# Patient Record
Sex: MALE
[De-identification: names, ages, dates, MRNs, and addresses within clinical notes are randomized per-mention and may not be internally consistent; named-entity substitution may affect disease eponyms.]

---

## 2022-11-25 ENCOUNTER — NURSE TRIAGE (OUTPATIENT)
Dept: OTHER | Facility: CLINIC | Age: 62
End: 2022-11-25

## 2022-11-25 NOTE — TELEPHONE ENCOUNTER
Subjective: Caller states \"woke up yesterday and my wrist was bothering me, came home last night and it flared up\"     Current Symptoms: right wrist pain and swelling. Unable to grab things due to the pain    Onset: 1 day ago; worsening    Associated Symptoms: reduced activity    Pain Severity: 9/10; dull, aching; constant    Temperature: No     What has been tried: tylenol    LMP: NA Pregnant: NA    Recommended disposition: Go to Office Now    Care advice provided, patient verbalizes understanding; denies any other questions or concerns; instructed to call back for any new or worsening symptoms. Patient/caller agrees to proceed to nearest [facility] (PCP office is closed today)    This triage is a result of a call to [address] [place] [address]. Please do not respond to the triage nurse through this encounter. Any subsequent communication should be directly with the patient.         Reason for Disposition   SEVERE pain (e.g., excruciating, unable to use hand at all)    Protocols used: Hand and Wrist Pain-ADULT-[place]